# Patient Record
Sex: MALE | URBAN - METROPOLITAN AREA
[De-identification: names, ages, dates, MRNs, and addresses within clinical notes are randomized per-mention and may not be internally consistent; named-entity substitution may affect disease eponyms.]

---

## 2023-06-05 ENCOUNTER — APPOINTMENT (RX ONLY)
Dept: URBAN - METROPOLITAN AREA CLINIC 86 | Facility: CLINIC | Age: 28
Setting detail: DERMATOLOGY
End: 2023-06-05

## 2023-06-05 VITALS — WEIGHT: 180 LBS | HEIGHT: 69 IN

## 2023-06-05 DIAGNOSIS — L90.5 SCAR CONDITIONS AND FIBROSIS OF SKIN: ICD-10-CM

## 2023-06-05 DIAGNOSIS — L91.0 HYPERTROPHIC SCAR: ICD-10-CM

## 2023-06-05 PROCEDURE — ? COUNSELING

## 2023-06-05 PROCEDURE — ? ADDITIONAL NOTES

## 2023-06-05 PROCEDURE — ? DEFER

## 2023-06-05 PROCEDURE — 99203 OFFICE O/P NEW LOW 30 MIN: CPT

## 2023-06-05 PROCEDURE — ? TREATMENT REGIMEN

## 2023-06-05 ASSESSMENT — LOCATION SIMPLE DESCRIPTION DERM
LOCATION SIMPLE: RIGHT FOREARM
LOCATION SIMPLE: RIGHT UPPER ARM

## 2023-06-05 ASSESSMENT — LOCATION DETAILED DESCRIPTION DERM
LOCATION DETAILED: RIGHT VENTRAL LATERAL DISTAL FOREARM
LOCATION DETAILED: RIGHT ANTERIOR DISTAL UPPER ARM

## 2023-06-05 ASSESSMENT — LOCATION ZONE DERM: LOCATION ZONE: ARM

## 2023-06-05 NOTE — PROCEDURE: DEFER
X Size Of Lesion In Cm (Optional): 0
Detail Level: Zone
Introduction Text (Please End With A Colon): The following procedure was deferred:
Instructions (Optional): Pt quoted $450 per treatment x 3 treatments each  by 1 month
Other Procedure: microneedling
Instructions (Optional): Pt given Dr. Leonarda weldon
Procedure To Be Performed At Next Visit: Scar Revision

## 2023-06-05 NOTE — PROCEDURE: ADDITIONAL NOTES
Additional Notes: Lesion injected no charge per Kamran. Pt will receive next injections for a total of $100 and no OV. Pt will prepay for next two injections with care credit \\n\\nLesions Injected: 1\\nMedication Injected: 10.0 mg/cc of Kenalog\\nTotal Volume Injected: 0.2cc\\DC #: 8170-7146-18\\nAdministered by: Юлия Andrew\\n\\nThe risks of atrophy were reviewed with the patient.
Detail Level: Simple
Render Risk Assessment In Note?: no

## 2023-06-07 ENCOUNTER — APPOINTMENT (RX ONLY)
Dept: URBAN - METROPOLITAN AREA CLINIC 86 | Facility: CLINIC | Age: 28
Setting detail: DERMATOLOGY
End: 2023-06-07

## 2023-06-07 DIAGNOSIS — Z41.9 ENCOUNTER FOR PROCEDURE FOR PURPOSES OTHER THAN REMEDYING HEALTH STATE, UNSPECIFIED: ICD-10-CM

## 2023-06-07 PROCEDURE — ? INVENTORY

## 2023-06-07 PROCEDURE — ? PHOTO-DOCUMENTATION

## 2023-06-07 PROCEDURE — ? ADDITIONAL NOTES

## 2023-06-07 PROCEDURE — ? DEFER

## 2023-06-07 ASSESSMENT — LOCATION DETAILED DESCRIPTION DERM: LOCATION DETAILED: RIGHT DISTAL RADIAL DORSAL FOREARM

## 2023-06-07 ASSESSMENT — LOCATION ZONE DERM: LOCATION ZONE: ARM

## 2023-06-07 ASSESSMENT — LOCATION SIMPLE DESCRIPTION DERM: LOCATION SIMPLE: RIGHT FOREARM

## 2023-06-07 NOTE — PROCEDURE: PHOTO-DOCUMENTATION
Detail Level: Zone
Details (Free Text): 6/7/23
Photo Preface (Leave Blank If You Do Not Want): Photographs were obtained today

## 2023-06-07 NOTE — PROCEDURE: DEFER
Instructions (Optional): Recommended scar revision excision
X Size Of Lesion In Cm (Optional): 0
Detail Level: Detailed
Introduction Text (Please End With A Colon): The following procedure was deferred:

## 2023-06-07 NOTE — PROCEDURE: ADDITIONAL NOTES
Detail Level: Simple
Additional Notes: Discussed details of scar revision excision. Advised patient that barbed quill sutures would be used. Gave patient a quote of $750 for procedure. A deposit of $115 was made today.
Render Risk Assessment In Note?: no

## 2023-06-26 ENCOUNTER — APPOINTMENT (RX ONLY)
Dept: URBAN - METROPOLITAN AREA CLINIC 86 | Facility: CLINIC | Age: 28
Setting detail: DERMATOLOGY
End: 2023-06-26

## 2023-06-26 DIAGNOSIS — L91.0 HYPERTROPHIC SCAR: ICD-10-CM

## 2023-06-26 PROCEDURE — ? COUNSELING

## 2023-06-26 PROCEDURE — ? ADDITIONAL NOTES

## 2023-06-26 PROCEDURE — 99212 OFFICE O/P EST SF 10 MIN: CPT

## 2023-06-26 ASSESSMENT — LOCATION ZONE DERM: LOCATION ZONE: ARM

## 2023-06-26 ASSESSMENT — LOCATION SIMPLE DESCRIPTION DERM: LOCATION SIMPLE: RIGHT UPPER ARM

## 2023-06-26 ASSESSMENT — LOCATION DETAILED DESCRIPTION DERM: LOCATION DETAILED: RIGHT ANTERIOR DISTAL UPPER ARM

## 2023-06-26 NOTE — PROCEDURE: ADDITIONAL NOTES
Additional Notes: OV + injection = $100 per Dr. Andrew. Significant improvement after 1st injection, repeat today then PRN additional injections. Start silicone scar sheets\\n\\nLesions Injected: 1\\nMedication Injected: 10.0 mg/cc of Kenalog\\nTotal Volume Injected: 0.2cc\\[facility] #: [id number]\\nAdministered by: Юлия Andrew\\n\\nThe risks of atrophy were reviewed with the patient.
Detail Level: Simple
Render Risk Assessment In Note?: no

## 2023-08-23 ENCOUNTER — APPOINTMENT (RX ONLY)
Dept: URBAN - METROPOLITAN AREA CLINIC 86 | Facility: CLINIC | Age: 28
Setting detail: DERMATOLOGY
End: 2023-08-23

## 2023-08-23 VITALS — WEIGHT: 180 LBS | HEIGHT: 60 IN

## 2023-08-23 DIAGNOSIS — L90.5 SCAR CONDITIONS AND FIBROSIS OF SKIN: ICD-10-CM

## 2023-08-23 PROCEDURE — 99212 OFFICE O/P EST SF 10 MIN: CPT

## 2023-08-23 PROCEDURE — ? PHOTO-DOCUMENTATION

## 2023-08-23 PROCEDURE — ? INVENTORY

## 2023-08-23 PROCEDURE — ? TREATMENT REGIMEN

## 2023-08-30 ENCOUNTER — APPOINTMENT (RX ONLY)
Dept: URBAN - METROPOLITAN AREA CLINIC 86 | Facility: CLINIC | Age: 28
Setting detail: DERMATOLOGY
End: 2023-08-30

## 2023-08-30 DIAGNOSIS — L90.5 SCAR CONDITIONS AND FIBROSIS OF SKIN: ICD-10-CM

## 2023-08-30 PROCEDURE — ? COUNSELING

## 2023-08-30 PROCEDURE — ? ADDITIONAL NOTES

## 2023-08-30 PROCEDURE — ? PHOTO-DOCUMENTATION

## 2023-08-30 PROCEDURE — 99212 OFFICE O/P EST SF 10 MIN: CPT

## 2023-08-30 ASSESSMENT — LOCATION SIMPLE DESCRIPTION DERM: LOCATION SIMPLE: RIGHT FOREARM

## 2023-08-30 ASSESSMENT — LOCATION ZONE DERM: LOCATION ZONE: ARM

## 2023-08-30 ASSESSMENT — LOCATION DETAILED DESCRIPTION DERM: LOCATION DETAILED: RIGHT DISTAL DORSAL FOREARM

## 2023-08-30 NOTE — PROCEDURE: PHOTO-DOCUMENTATION
Detail Level: Zone
Details (Free Text): Healing scar
Photo Preface (Leave Blank If You Do Not Want): Photographs were obtained today

## 2023-08-30 NOTE — PROCEDURE: ADDITIONAL NOTES
Additional Notes: Wound appears to be healing well. Steri-strips removed and changed today. A non-adherent dressing was applied today per patient request as he was going to work.
Detail Level: Simple
Render Risk Assessment In Note?: no

## 2023-09-06 ENCOUNTER — APPOINTMENT (RX ONLY)
Dept: URBAN - METROPOLITAN AREA CLINIC 86 | Facility: CLINIC | Age: 28
Setting detail: DERMATOLOGY
End: 2023-09-06

## 2023-09-06 ENCOUNTER — RX ONLY (OUTPATIENT)
Age: 28
Setting detail: RX ONLY
End: 2023-09-06

## 2023-09-06 DIAGNOSIS — Z48.02 ENCOUNTER FOR REMOVAL OF SUTURES: ICD-10-CM

## 2023-09-06 PROCEDURE — ? PHOTO-DOCUMENTATION

## 2023-09-06 PROCEDURE — ? ADDITIONAL NOTES

## 2023-09-06 PROCEDURE — ? COUNSELING

## 2023-09-06 PROCEDURE — 99211 OFF/OP EST MAY X REQ PHY/QHP: CPT

## 2023-09-06 RX ORDER — TRIAMCINOLONE ACETONIDE 1 MG/G
OINTMENT TOPICAL
Qty: 15 | Refills: 0 | Status: ERX | COMMUNITY
Start: 2023-09-06

## 2023-09-06 ASSESSMENT — LOCATION ZONE DERM: LOCATION ZONE: ARM

## 2023-09-06 ASSESSMENT — LOCATION DETAILED DESCRIPTION DERM: LOCATION DETAILED: RIGHT DISTAL DORSAL FOREARM

## 2023-09-06 ASSESSMENT — LOCATION SIMPLE DESCRIPTION DERM: LOCATION SIMPLE: RIGHT FOREARM

## 2023-09-06 NOTE — PROCEDURE: ADDITIONAL NOTES
Render Risk Assessment In Note?: no
Additional Notes: Dr. Brower advised pt to use a compression wrap/ACE wrap for another 2 weeks. Pt reports itchiness around scar. Samples of Flavio-Synalar provided. Will also send Triamcinalone ointment PRN for itch if Flavio-Synalar does not help for itchiness.
Detail Level: Simple